# Patient Record
Sex: MALE | Race: WHITE | NOT HISPANIC OR LATINO | Employment: OTHER | URBAN - METROPOLITAN AREA
[De-identification: names, ages, dates, MRNs, and addresses within clinical notes are randomized per-mention and may not be internally consistent; named-entity substitution may affect disease eponyms.]

---

## 2017-02-15 ENCOUNTER — GENERIC CONVERSION - ENCOUNTER (OUTPATIENT)
Dept: OTHER | Facility: OTHER | Age: 72
End: 2017-02-15

## 2017-02-15 ENCOUNTER — ALLSCRIPTS OFFICE VISIT (OUTPATIENT)
Dept: OTHER | Facility: OTHER | Age: 72
End: 2017-02-15

## 2018-03-12 DIAGNOSIS — L71.9 ROSACEA: Primary | ICD-10-CM

## 2018-03-12 RX ORDER — METRONIDAZOLE 10 MG/G
GEL TOPICAL
Qty: 60 G | Refills: 0 | Status: SHIPPED | OUTPATIENT
Start: 2018-03-12 | End: 2018-07-18 | Stop reason: SDUPTHER

## 2018-07-18 ENCOUNTER — OFFICE VISIT (OUTPATIENT)
Dept: DERMATOLOGY | Facility: CLINIC | Age: 73
End: 2018-07-18
Payer: COMMERCIAL

## 2018-07-18 DIAGNOSIS — L30.8 PSORIASIFORM DERMATITIS: ICD-10-CM

## 2018-07-18 DIAGNOSIS — D22.9 MELANOCYTIC NEVUS, UNSPECIFIED LOCATION: ICD-10-CM

## 2018-07-18 DIAGNOSIS — L71.9 ROSACEA: ICD-10-CM

## 2018-07-18 DIAGNOSIS — D22.9 NEVUS: Primary | ICD-10-CM

## 2018-07-18 DIAGNOSIS — Z13.89 SCREENING FOR SKIN CONDITION: ICD-10-CM

## 2018-07-18 DIAGNOSIS — L82.1 SEBORRHEIC KERATOSIS: ICD-10-CM

## 2018-07-18 PROCEDURE — 88305 TISSUE EXAM BY PATHOLOGIST: CPT | Performed by: PATHOLOGY

## 2018-07-18 PROCEDURE — 99213 OFFICE O/P EST LOW 20 MIN: CPT | Performed by: DERMATOLOGY

## 2018-07-18 PROCEDURE — 11300 SHAVE SKIN LESION 0.5 CM/<: CPT | Performed by: DERMATOLOGY

## 2018-07-18 RX ORDER — DESOXIMETASONE 2.5 MG/G
OINTMENT TOPICAL 2 TIMES DAILY
Qty: 15 G | Refills: 3 | Status: SHIPPED | OUTPATIENT
Start: 2018-07-18 | End: 2019-09-09 | Stop reason: SDUPTHER

## 2018-07-18 RX ORDER — METRONIDAZOLE 10 MG/G
1 GEL TOPICAL DAILY
Qty: 60 G | Refills: 3 | Status: SHIPPED | OUTPATIENT
Start: 2018-07-18 | End: 2019-09-09 | Stop reason: SDUPTHER

## 2018-07-18 NOTE — PROGRESS NOTES
[address] [facility] OF [facility] DERMATOLOGY  [address] [address]     MRN: [id number] : 1945  Encounter: [id number]  Patient Information: Crow Daniel  Chief complaint:Yearly skin check for concerns regarding rosacea as well his psoriasiform dermatitis     History of present illness:  53-year-old male presents for follow-up for his history of rosacea and psoriasiform dermatitis no specific problems noted not aware the mole we noted on exam   In addition notes that he has had some irritation perianally that at this point it seems to have resolved also complaining of  A stye  No past medical history on file  No past surgical history on file  Social History   History   Alcohol use Not on file     History   Drug use: Unknown     History   Smoking Status    Never Smoker   Smokeless Tobacco    Never Used     No family history on file  Meds/Allergies   No Known Allergies    Meds:  Prior to Admission medications    Medication Sig Start Date End Date Taking?  Authorizing Provider   clonazePAM (KlonoPIN) 1 mg tablet Take by mouth   Yes Historical Provider, MD   desoximetasone (TOPICORT) 0 25 % ointment Apply topically 2 (two) times a day   Yes Historical Provider, MD   metroNIDAZOLE (METROGEL) 1 % gel APPLY SPARINGLY TO FACE AND SCALP ONCE DAILY 3/12/18  Yes David Double, MD   traMADol Everlyn Servant) 50 mg tablet Take 50 mg by mouth 4 (four) times a day as needed 17  Yes Historical Provider, MD       Subjective:     Review of Systems:    General: negative for - chills, fatigue, fever,  weight gain or weight loss  Psychological: negative for - anxiety, behavioral disorder, concentration difficulties, decreased libido, depression, irritability, memory difficulties, mood swings, sleep disturbances or suicidal ideation  ENT: negative for - hearing difficulties , nasal congestion, nasal discharge, oral lesions, sinus pain, sneezing, sore throat  Allergy and Immunology: negative for - hives, insect bite sensitivity,  Hematological and Lymphatic: negative for - bleeding problems, blood clots,bruising, swollen lymph nodes  Endocrine: negative for - hair pattern changes, hot flashes, malaise/lethargy, mood swings, palpitations, polydipsia/polyuria, skin changes, temperature intolerance or unexpected weight change  Respiratory: negative for - cough, hemoptysis, orthopnea, shortness of breath, or wheezing  Cardiovascular: negative for - chest pain, dyspnea on exertion, edema,  Gastrointestinal: negative for - abdominal pain, nausea/vomiting  Genito-Urinary: negative for - dysuria, incontinence, irregular/heavy menses or urinary frequency/urgency  Musculoskeletal: negative for - gait disturbance, joint pain, joint stiffness, joint swelling, muscle pain, muscular weakness  Dermatological:  As in HPI  Neurological: negative for confusion, dizziness, headaches, impaired coordination/balance, memory loss, numbness/tingling, seizures, speech problems, tremors or weakness       Objective: There were no vitals taken for this visit  Physical Exam:    General Appearance:    Alert, cooperative, no distress   Head:    Normocephalic, without obvious abnormality, atraumatic           Skin:   A full skin exam was performed including scalp, head scalp, eyes, ears, nose, lips, neck, chest, axilla, abdomen, back, buttocks, bilateral upper extremities, bilateral lower extremities, hands, feet, fingers, toes, fingernails, and toenails  5 mm pigmented macule noted on the back with some slight asymmetric pigmentation nothing else atypical noted normal keratotic papules greasy stuck on appearance no active psoriasiform rash facial erythema has diminished without papules or pustules           Shave excision Procedure Note    Pre-operative Diagnosis:  Irritated nevus    Plan:  1  Instructed to keep the wound dry and covered for 24 and clean thereafter    2  Warning signs of infection were reviewed  3  Recommended that the patient use OTC acetaminophen as needed for pain  Locations:   Lower back  Indications:   Irregular appearance    Anesthesia: Lidocaine 1% without epinephrine without added sodium bicarbonate    Procedure Details     Patient informed of the risks (including bleeding and infection) and benefits of the   procedure and Verbal informed consent obtained  The lesion and surrounding area were prepped using alcohol  A Blue blade razor was used to remove the lesion  Hemostasis achieved with aluminum chloride  Petrolatum and a sterile dressing applied  The specimen was sent for pathologic examination  The patient tolerated the procedure(s) well  Complications:  none  Assessment:     1  Nevus     2  Psoriasiform dermatitis  desoximetasone (TOPICORT) 0 25 % ointment   3  Screening for skin condition     4  Melanocytic nevus, unspecified location     5  Rosacea  metroNIDAZOLE (METROGEL) 1 % gel   6  Seborrheic keratosis           Plan:   Nevi reviewed the concept of ABCDE and ugly duckling nothing markedly atypical patient reassured lesion on back was removed because of slight atypical appearance no previous history  rosacea under good control continue same therapy   psoriasiform dermatitis not active refilled his prescription  awseb  Screening for Dermatologic Disorders: Nothing else of concern noted on complete exam follow up in 1 year  Patient with a stye advised to see an ophthalmologist if this is continues to be an issue        Johan Hua MD  9/50/4794,9:90 PM    Portions of the record may have been created with voice recognition software   Occasional wrong word or "sound a like" substitutions may have occurred due to the inherent limitations of voice recognition software   Read the chart carefully and recognize, using context, where substitutions have occurred

## 2018-07-18 NOTE — PATIENT INSTRUCTIONS
Nevi reviewed the concept of ABCDE and ugly duckling nothing markedly atypical patient reassured lesion on back was removed because of slight atypical appearance no previous history  rosacea under good control continue same therapy   psoriasiform dermatitis not active refilled his prescription  awseb  Screening for Dermatologic Disorders: Nothing else of concern noted on complete exam follow up in 1 year  Patient with a stye advised to see an ophthalmologist if this is continues to be an issue  Wound care instructions given to patient

## 2018-07-24 ENCOUNTER — TELEPHONE (OUTPATIENT)
Dept: DERMATOLOGY | Facility: CLINIC | Age: 73
End: 2018-07-24

## 2018-07-24 NOTE — TELEPHONE ENCOUNTER
----- Message from Ambrose Graff MD sent at 7/23/2018  1:48 PM EDT -----  Please call him of normal pathology

## 2018-07-27 ENCOUNTER — OFFICE VISIT (OUTPATIENT)
Dept: GASTROENTEROLOGY | Facility: CLINIC | Age: 73
End: 2018-07-27
Payer: COMMERCIAL

## 2018-07-27 VITALS
BODY MASS INDEX: 28.49 KG/M2 | SYSTOLIC BLOOD PRESSURE: 124 MMHG | DIASTOLIC BLOOD PRESSURE: 70 MMHG | WEIGHT: 215 LBS | HEIGHT: 73 IN | HEART RATE: 78 BPM

## 2018-07-27 DIAGNOSIS — R19.4 CHANGE IN BOWEL HABITS: Primary | ICD-10-CM

## 2018-07-27 DIAGNOSIS — R19.7 DIARRHEA, UNSPECIFIED TYPE: ICD-10-CM

## 2018-07-27 DIAGNOSIS — R15.2 FECAL URGENCY: ICD-10-CM

## 2018-07-27 PROBLEM — R10.32 LLQ PAIN: Status: ACTIVE | Noted: 2018-07-27

## 2018-07-27 PROCEDURE — 99204 OFFICE O/P NEW MOD 45 MIN: CPT | Performed by: INTERNAL MEDICINE

## 2018-07-27 NOTE — PROGRESS NOTES
[facility]      Chief Complaint:  Change in bowel habits    HPI:  Gracie Gutierrez is a self-referred 81 y o   male who presents with change in bowel habits  According to the patient he has a lifelong history of what he calls colitis  He was never diagnosed with ulcerative colitis that he knows of  In all likelihood this is irritable bowel syndrome  His last colonoscopy was many years ago  According to the patient for sometime now he has been having postprandial urgency and cramping  Having a bowel movement relieves this  He is having some loose stools  There is no bleeding  No weight loss  He denies any melena or hematochezia  He has occasional nocturnal symptomatology  Patient notes that he eats ice cream very frequently  No nausea or vomiting  No dysphagia  No fever or chills  No other definitive exacerbating or remitting factors  No exertional symptomatology  He notes that his sister and her son both have probable lactose intolerance  No other family history of any GI disease  Review of Systems:   Constitutional: No fever or chills, feels well, no tiredness, no recent weight gain or weight loss  HENT: No complaints of earache, no hearing loss, no nosebleeds, no nasal discharge, no sore throat, no hoarseness  Eyes: No complaints of eye pain, no red eyes, no discharge from eyes, no itchy eyes  Cardiovascular: No complaints of slow heart rate, no fast heart rate, no chest pain, no palpitations, no leg claudication, no lower extremity edema  Respiratory: No complaints of shortness of breath, no wheezing, no cough, no SOB on exertion, no orthopnea  Gastrointestinal: As noted in HPI  Genitourinary: No complaints of dysuria, no incontinence, no hesitancy, no nocturia  Musculoskeletal: No complaints of arthralgia, no myalgias, no joint swelling or stiffness, no limb pain or swelling     Neurological:  Peripheral neuropathy, dizziness  Skin: No complaints of skin rash or skin lesions, no itching, no skin wound, no dry skin  Hematological/Lymphatic: No complaints of swollen glands, does not bleed easy  Allergic/Immunologic: No immunocompromised state  Endocrine:  No complaints of polyuria, no polydipsia  Psychiatric/Behavioral: is not suicidal, no sleep disturbances, no anxiety or depression, no change in personality, no emotional problems  Historical Information   Past Medical History:   Diagnosis Date    Insomnia     Neuropathy      Past Surgical History:   Procedure Laterality Date    TONSILLECTOMY       Social History   History   Alcohol Use    Yes     Comment: Social     History   Drug Use No     History   Smoking Status    Never Smoker   Smokeless Tobacco    Never Used     Family History   Problem Relation Age of Onset    Stroke Mother     Lactose intolerance Sister          Current Medications: has a current medication list which includes the following prescription(s): clonazepam, desoximetasone, metronidazole, and tramadol  Vital Signs: /70   Pulse 78   Ht 6' 1" (1 854 m)   Wt 97 5 kg (215 lb)   BMI 28 37 kg/m²       Physical Exam:   Constitutional  General Appearance: No acute distress, well appearing and well nourished  Head  Normocephalic  Eyes  Conjunctivae and lids: No swelling, erythema, or discharge  Pupils and irises: Equal, round and reactive to light  Ears, Nose, Mouth, and Throat  External inspection of ears and nose: Normal  Nasal mucosa, septum and turbinates: Normal without edema or erythema/   Oropharynx: Normal with no erythema, edema, exudate or lesions  Neck  Normal range of motion  Neck supple  Cardiovascular  Auscultation of the heart: Normal rate and rhythm, normal S1 and S2 without murmurs  Examination of the extremities for edema and/or varicosities: Normal  Pulmonary/Chest  Respiratory effort: No increased work of breathing or signs of respiratory distress     Auscultation of lungs: Clear to auscultation, equal breath sounds bilaterally, no wheezes, rales, no rhonchi  Abdomen  Abdomen: Non-tender, no masses  Liver and spleen: No hepatomegaly or splenomegaly  Musculoskeletal  Gait and station: normal   Digits and Nails: normal without clubbing or cyanosis  Inspection/palpation of joints, bones, and muscles: Normal  Neurological  No nystagmus or asterixis  Skin  Skin and subcutaneous tissue: Normal without rashes or lesions  Lymphatic  Palpation of the lymph nodes in neck: No lymphadenopathy  Psychiatric  Orientation to person, place and time: Normal   Mood and affect: Normal          Labs:  No results found for: ALBUMIN, ALT, AST, BUN, CALCIUM, CL, CHOL, CO2, CREATININE, GFRAA, GFRNONAA, GLU, HDL, HCT, HGB, HGBA1C, LDL, MG, PHOS, PLT, K, PSA, NA, TRIG, WBC      X-Rays & Procedures:   No orders to display           ______________________________________________________________________      Assessment & Plan:     Diagnoses and all orders for this visit:    Change in bowel habits    Fecal urgency    Diarrhea, unspecified type    Other orders  -     Diet NPO; Sips with meds; Standing  -     Void on call to OR; Standing  -     Insert peripheral IV; Standing      Patient will be scheduled for colonoscopy  He is given a copy of a lactose-free diet  Further recommendations will depend on the study results

## 2018-08-09 ENCOUNTER — ANESTHESIA (OUTPATIENT)
Dept: PERIOP | Facility: HOSPITAL | Age: 73
End: 2018-08-09
Payer: COMMERCIAL

## 2018-08-09 ENCOUNTER — HOSPITAL ENCOUNTER (OUTPATIENT)
Facility: HOSPITAL | Age: 73
Setting detail: OUTPATIENT SURGERY
Discharge: HOME | End: 2018-08-09
Attending: INTERNAL MEDICINE | Admitting: INTERNAL MEDICINE
Payer: COMMERCIAL

## 2018-08-09 ENCOUNTER — ANESTHESIA EVENT (OUTPATIENT)
Dept: PERIOP | Facility: HOSPITAL | Age: 73
End: 2018-08-09
Payer: COMMERCIAL

## 2018-08-09 VITALS
WEIGHT: 206.35 LBS | DIASTOLIC BLOOD PRESSURE: 64 MMHG | RESPIRATION RATE: 16 BRPM | SYSTOLIC BLOOD PRESSURE: 127 MMHG | HEART RATE: 51 BPM | OXYGEN SATURATION: 99 % | BODY MASS INDEX: 27.35 KG/M2 | HEIGHT: 73 IN | TEMPERATURE: 97.1 F

## 2018-08-09 DIAGNOSIS — R10.32 LLQ PAIN: ICD-10-CM

## 2018-08-09 DIAGNOSIS — R19.7 DIARRHEA, UNSPECIFIED TYPE: ICD-10-CM

## 2018-08-09 PROCEDURE — 45384 COLONOSCOPY W/LESION REMOVAL: CPT | Performed by: INTERNAL MEDICINE

## 2018-08-09 PROCEDURE — 45380 COLONOSCOPY AND BIOPSY: CPT | Performed by: INTERNAL MEDICINE

## 2018-08-09 PROCEDURE — 88305 TISSUE EXAM BY PATHOLOGIST: CPT | Performed by: PATHOLOGY

## 2018-08-09 RX ORDER — ONDANSETRON 2 MG/ML
4 INJECTION INTRAMUSCULAR; INTRAVENOUS ONCE AS NEEDED
Status: CANCELLED | OUTPATIENT
Start: 2018-08-09

## 2018-08-09 RX ORDER — SODIUM CHLORIDE, SODIUM LACTATE, POTASSIUM CHLORIDE, CALCIUM CHLORIDE 600; 310; 30; 20 MG/100ML; MG/100ML; MG/100ML; MG/100ML
75 INJECTION, SOLUTION INTRAVENOUS CONTINUOUS
Status: DISCONTINUED | OUTPATIENT
Start: 2018-08-09 | End: 2018-08-09 | Stop reason: HOSPADM

## 2018-08-09 RX ORDER — SODIUM CHLORIDE, SODIUM LACTATE, POTASSIUM CHLORIDE, CALCIUM CHLORIDE 600; 310; 30; 20 MG/100ML; MG/100ML; MG/100ML; MG/100ML
125 INJECTION, SOLUTION INTRAVENOUS CONTINUOUS
Status: DISCONTINUED | OUTPATIENT
Start: 2018-08-09 | End: 2018-08-09 | Stop reason: HOSPADM

## 2018-08-09 RX ORDER — PROPOFOL 10 MG/ML
INJECTION, EMULSION INTRAVENOUS AS NEEDED
Status: DISCONTINUED | OUTPATIENT
Start: 2018-08-09 | End: 2018-08-09 | Stop reason: SURG

## 2018-08-09 RX ORDER — MEPERIDINE HYDROCHLORIDE 50 MG/ML
12.5 INJECTION INTRAMUSCULAR; INTRAVENOUS; SUBCUTANEOUS AS NEEDED
Status: CANCELLED | OUTPATIENT
Start: 2018-08-09

## 2018-08-09 RX ORDER — ALBUTEROL SULFATE 2.5 MG/3ML
2.5 SOLUTION RESPIRATORY (INHALATION) ONCE AS NEEDED
Status: CANCELLED | OUTPATIENT
Start: 2018-08-09

## 2018-08-09 RX ADMIN — PROPOFOL 10 MG: 10 INJECTION, EMULSION INTRAVENOUS at 09:07

## 2018-08-09 RX ADMIN — PROPOFOL 100 MG: 10 INJECTION, EMULSION INTRAVENOUS at 08:56

## 2018-08-09 RX ADMIN — PROPOFOL 30 MG: 10 INJECTION, EMULSION INTRAVENOUS at 09:00

## 2018-08-09 RX ADMIN — PROPOFOL 30 MG: 10 INJECTION, EMULSION INTRAVENOUS at 09:04

## 2018-08-09 RX ADMIN — SODIUM CHLORIDE, SODIUM LACTATE, POTASSIUM CHLORIDE, AND CALCIUM CHLORIDE: .6; .31; .03; .02 INJECTION, SOLUTION INTRAVENOUS at 08:00

## 2018-08-09 NOTE — ANESTHESIA POSTPROCEDURE EVALUATION
Post-Op Assessment Note      CV Status:  Stable    Mental Status:  Alert and awake    Hydration Status:  Stable    PONV Controlled:  None    Airway Patency:  Patent and adequate    Post Op Vitals Reviewed: Yes          Staff: Anesthesiologist, CRNA           BP   112/62   Temp     Pulse  66   Resp   18   SpO2   98%

## 2018-08-09 NOTE — DISCHARGE INSTRUCTIONS
Impression:  1  Ascending colon polyp status post hot biopsy polypectomy     2  Pancolonic diverticulosis, moderate     3  Status post ascending and descending biopsies for microscopic colitis        RECOMMENDATIONS:  Call in 1 week for biopsy results  Follow-up colonoscopy in 5 years       Colonoscopy   WHAT YOU NEED TO KNOW:   A colonoscopy is a procedure to examine the inside of your colon (intestine) with a scope  Polyps or tissue growths may have been removed during your colonoscopy  It is normal to feel bloated and to have some abdominal discomfort  You should be passing gas  If you have hemorrhoids or you had polyps removed, you may have a small amount of bleeding  DISCHARGE INSTRUCTIONS:   Seek care immediately if:   · You have a large amount of bright red blood in your bowel movements  · Your abdomen is hard and firm and you have severe pain  · You have sudden trouble breathing  Contact your healthcare provider if:   · You develop a rash or hives  · You have a fever within 24 hours of your procedure  · You have not had a bowel movement for 3 days after your procedure  · You have questions or concerns about your condition or care  Activity:   · Do not lift, strain, or run  for 3 days after your procedure  · Rest after your procedure  You have been given medicine to relax you  Do not  drive or make important decisions until the day after your procedure  Return to your normal activity as directed  · Relieve gas and discomfort from bloating  by lying on your right side with a heating pad on your abdomen  You may need to take short walks to help the gas move out  Eat small meals until bloating is relieved  If you had polyps removed: For 7 days after your procedure:  · Do not  take aspirin  · Do not  go on long car rides  Help prevent constipation:   · Eat a variety of healthy foods    Healthy foods include fruit, vegetables, whole-grain breads, low-fat dairy products, beans, lean meat, and fish  Ask if you need to be on a special diet  Your healthcare provider may recommend that you eat high-fiber foods such as cooked beans  Fiber helps you have regular bowel movements  · Drink liquids as directed  Adults should drink between 9 and 13 eight-ounce cups of liquid every day  Ask what amount is best for you  For most people, good liquids to drink are water, juice, and milk  · Exercise as directed  Talk to your healthcare provider about the best exercise plan for you  Exercise can help prevent constipation, decrease your blood pressure and improve your health  Follow up with your healthcare provider as directed:  Write down your questions so you remember to ask them during your visits  © 2017 [address] Information is for End User's use only and may not be sold, redistributed or otherwise used for commercial purposes  All illustrations and images included in CareNotes® are the copyrighted property of [employer] [address] [employer]  or Yonny Boyd  The above information is an  only  It is not intended as medical advice for individual conditions or treatments  Talk to your doctor, nurse or pharmacist before following any medical regimen to see if it is safe and effective for you

## 2018-08-09 NOTE — OP NOTE
Postoperative Note  PATIENT NAME: Srinivas Anderson  : 1945  MRN: [id number]  [facility] ROOM 01    Surgery Date: 2018    POST-OP DIAGNOSIS: See the impression below    SEDATION: Monitored anesthesia care, check anesthesia records    PHYSICAL EXAM:  Vitals:    18 0804   BP: 130/74   Pulse: 70   Resp: 18   Temp: (!) 97 1 °F (36 2 °C)   SpO2: 98%     Body mass index is 27 22 kg/m²  General: NAD  Heart: S1 & S2 normal, RRR  Lungs: CTA, No rales or rhonchi  Abdomen: Soft, nontender, nondistended, good bowel sounds    CONSENT:  Informed consent was obtained for the procedure, including sedation after explaining the risks and benefits of the procedure  Risks including but not limited to bleeding, perforation, infection, aspiration were discussed in detail  Also explained about less than 100%$ sensitivity with the exam and other alternatives  DESCRIPTION:   Procedure:  Fiberoptic colonoscopy with hot biopsy polypectomy and excisional biopsy    Indications:  24-year-old male with a change in bowel habits and diarrhea of undetermined etiology    ASA 2    Premedicated with mac anesthesia    Patient is identified by me  He is examined prior to the procedure and found to be in stable condition  He is attached a cardiac monitor and pulse oximeter and placed in the left lateral position  After adequate intravenous sedation the Olympus video colonoscope was inserted and passed easily to the cecum  The ileocecal valve and the appendiceal orifice are identified and the scope was slowly withdrawn with excellent circumferential visualization of the mucosa  The preparation is excellent  A single diminutive sessile polyp was found in the ascending colon  This was removed completely with hot biopsy technique with excellent residual chart  Polyp was retrieved and sent to pathology  Note is made of diverticuli throughout the colon  These are small and medium in size    None have impacted fecaliths a none her inflamed or bleeding  Biopsies taken of the ascending and descending colon rule out microscopic colitis  There is excellent hemostasis  Retroversion is normal  No other inflammatory neoplastic or vascular lesions are noted  The patient tolerated the procedure well and was stable throughout  Impression:  1  Ascending colon polyp status post hot biopsy polypectomy    2  Pancolonic diverticulosis, moderate    3  Status post ascending and descending biopsies for microscopic colitis      RECOMMENDATIONS:  Call in 1 week for biopsy results  Follow-up colonoscopy in 5 years    COMPLICATIONS:  None; patient tolerated the procedure well  DISPOSITION: PACU  CONDITION: Stable    Austin Alan MD  8/9/2018,9:10 AM        Portions of the record may have been created with voice recognition software   Occasional wrong word or "sound a like" substitutions may have occurred due to the inherent limitations of voice recognition software   Read the chart carefully and recognize, using context, where substitutions have occurred

## 2018-08-09 NOTE — ANESTHESIA PREPROCEDURE EVALUATION
Review of Systems/Medical History  Patient summary reviewed        Cardiovascular  Negative cardio ROS    Pulmonary  Negative pulmonary ROS        GI/Hepatic  Negative GI/hepatic ROS          Negative  ROS        Endo/Other  Negative endo/other ROS      GYN  Negative gynecology ROS          Hematology  Negative hematology ROS      Musculoskeletal  Negative musculoskeletal ROS        Neurology  Negative neurology ROS      Psychology   Negative psychology ROS              Physical Exam    Airway    Mallampati score: II  TM Distance: >3 FB  Neck ROM: full     Dental   No notable dental hx     Cardiovascular  Comment: Negative ROS,     Pulmonary      Other Findings        Anesthesia Plan  ASA Score- 1     Anesthesia Type- IV sedation with anesthesia with ASA Monitors  Additional Monitors:   Airway Plan:     Comment: I have seen the patient and reviewed the history  Patient to receive IV sedation with full ASA monitors  Risks discussed with the patient, consent signed        Plan Factors-    Induction- intravenous  Postoperative Plan-     Informed Consent- Anesthetic plan and risks discussed with patient  I personally reviewed this patient with the CRNA  Discussed and agreed on the Anesthesia Plan with the CRNA  Bree Hayes

## 2018-08-16 ENCOUNTER — TELEPHONE (OUTPATIENT)
Dept: GASTROENTEROLOGY | Facility: CLINIC | Age: 73
End: 2018-08-16

## 2019-09-09 ENCOUNTER — OFFICE VISIT (OUTPATIENT)
Dept: DERMATOLOGY | Facility: CLINIC | Age: 74
End: 2019-09-09
Payer: COMMERCIAL

## 2019-09-09 DIAGNOSIS — Z13.89 SCREENING FOR SKIN CONDITION: ICD-10-CM

## 2019-09-09 DIAGNOSIS — L30.8 PSORIASIFORM DERMATITIS: ICD-10-CM

## 2019-09-09 DIAGNOSIS — L82.1 SEBORRHEIC KERATOSIS: Primary | ICD-10-CM

## 2019-09-09 DIAGNOSIS — L71.9 ROSACEA: ICD-10-CM

## 2019-09-09 PROCEDURE — 99213 OFFICE O/P EST LOW 20 MIN: CPT | Performed by: DERMATOLOGY

## 2019-09-09 RX ORDER — DESOXIMETASONE 2.5 MG/G
OINTMENT TOPICAL 2 TIMES DAILY
Qty: 15 G | Refills: 3 | Status: SHIPPED | OUTPATIENT
Start: 2019-09-09

## 2019-09-09 RX ORDER — METRONIDAZOLE 10 MG/G
1 GEL TOPICAL DAILY
Qty: 60 G | Refills: 3 | Status: SHIPPED | OUTPATIENT
Start: 2019-09-09 | End: 2021-04-01 | Stop reason: SDUPTHER

## 2019-09-09 NOTE — PROGRESS NOTES
[facility] DERMATOLOGY  [address] [place] [address]  Wendi Goldstein [place] [address]  [phone]  [phone]     MRN: [id number] : 1945  Encounter: [id number]  Patient Information: Douglas Braden  Chief complaint:  Yearly checkup    History of present illness:  72-year-old male with history of psoriasiform dermatitis and rosacea presents for overall checkup overall doing well both conditions appear to  be under control no other concerns noted  Past Medical History:   Diagnosis Date    Anxiety     Insomnia     Neuropathy      Past Surgical History:   Procedure Laterality Date    APPENDECTOMY      COLONOSCOPY      KNEE ARTHROSCOPY      Right knee    [place] COLONOSCOPY FLX DX W/COLLJ SPEC WHEN PFRMD N/A 2018    Procedure: COLONOSCOPY;  Surgeon: Jamil Alex MD;  Location: [facility]; Service: Gastroenterology     Social History   Social History     Substance and Sexual Activity   Alcohol Use Yes    Comment: Social     Social History     Substance and Sexual Activity   Drug Use No     Social History     Tobacco Use   Smoking Status Never Smoker   Smokeless Tobacco Never Used     Family History   Problem Relation Age of Onset    Stroke Mother     Lactose intolerance Sister      Meds/Allergies   No Known Allergies    Meds:  Prior to Admission medications    Medication Sig Start Date End Date Taking?  Authorizing Provider   clonazePAM (KlonoPIN) 1 mg tablet Take by mouth   Yes Historical Provider, MD   desoximetasone (TOPICORT) 0 25 % ointment Apply topically 2 (two) times a day To rash ionbody as needed 18  Yes Jessica Reyna MD   metroNIDAZOLE (METROGEL) 1 % gel Apply 1 application topically daily Sparingly to face and scalp 18  Yes Jessica Reyna MD   traMADol Shirlie Kaur) 50 mg tablet Take 50 mg by mouth 4 (four) times a day as needed 17   Historical Provider, MD       Subjective:     Review of Systems:    General: negative for - chills, fatigue, fever, weight gain or weight loss  Psychological: negative for - anxiety, behavioral disorder, concentration difficulties, decreased libido, depression, irritability, memory difficulties, mood swings, sleep disturbances or suicidal ideation  ENT: negative for - hearing difficulties , nasal congestion, nasal discharge, oral lesions, sinus pain, sneezing, sore throat  Allergy and Immunology: negative for - hives, insect bite sensitivity,  Hematological and Lymphatic: negative for - bleeding problems, blood clots,bruising, swollen lymph nodes  Endocrine: negative for - hair pattern changes, hot flashes, malaise/lethargy, mood swings, palpitations, polydipsia/polyuria, skin changes, temperature intolerance or unexpected weight change  Respiratory: negative for - cough, hemoptysis, orthopnea, shortness of breath, or wheezing  Cardiovascular: negative for - chest pain, dyspnea on exertion, edema,  Gastrointestinal: negative for - abdominal pain, nausea/vomiting  Genito-Urinary: negative for - dysuria, incontinence, irregular/heavy menses or urinary frequency/urgency  Musculoskeletal: negative for - gait disturbance, joint pain, joint stiffness, joint swelling, muscle pain, muscular weakness  Dermatological:  As in HPI  Neurological: negative for confusion, dizziness, headaches, impaired coordination/balance, memory loss, numbness/tingling, seizures, speech problems, tremors or weakness       Objective: There were no vitals taken for this visit      Physical Exam:    General Appearance:    Alert, cooperative, no distress   Head:    Normocephalic, without obvious abnormality, atraumatic           Skin:   A full skin exam was performed including scalp, head scalp, eyes, ears, nose, lips, neck, chest, axilla, abdomen, back, buttocks, bilateral upper extremities, bilateral lower extremities, hands, feet, fingers, toes, fingernails, and toenails facial erythema without papules pustules marked improvement no active rash noted at this time     Assessment:     1  Seborrheic keratosis     2  Psoriasiform dermatitis  desoximetasone (TOPICORT) 0 25 % ointment   3  Rosacea  metroNIDAZOLE (METROGEL) 1 % gel   4  Screening for skin condition           Plan:   Seborrheic Keratosis  Patient reasurred these are normal growths we acquire with age no treatment needed  Psoriasiform dermatitis rosacea under good control continue same therapy  Screening for Dermatologic Disorders: Nothing else of concern noted on complete exam follow up in 1 year       Liana Gitelman, MD  9/9/2019,3:19 PM    Portions of the record may have been created with voice recognition software   Occasional wrong word or "sound a like" substitutions may have occurred due to the inherent limitations of voice recognition software   Read the chart carefully and recognize, using context, where substitutions have occurred

## 2019-09-09 NOTE — PATIENT INSTRUCTIONS
Seborrheic Keratosis  Patient reasurred these are normal growths we acquire with age no treatment needed    Psoriasiform dermatitis rosacea under good control continue same therapy  Screening for Dermatologic Disorders: Nothing else of concern noted on complete exam follow up in 1 year

## 2021-03-05 DIAGNOSIS — Z23 ENCOUNTER FOR IMMUNIZATION: ICD-10-CM

## 2021-04-01 ENCOUNTER — TELEMEDICINE (OUTPATIENT)
Dept: DERMATOLOGY | Facility: CLINIC | Age: 76
End: 2021-04-01
Payer: COMMERCIAL

## 2021-04-01 DIAGNOSIS — L71.9 ROSACEA: ICD-10-CM

## 2021-04-01 DIAGNOSIS — R21 RASH: ICD-10-CM

## 2021-04-01 DIAGNOSIS — Z13.89 SCREENING FOR SKIN CONDITION: ICD-10-CM

## 2021-04-01 DIAGNOSIS — L30.8 PSORIASIFORM DERMATITIS: Primary | ICD-10-CM

## 2021-04-01 PROCEDURE — 99213 OFFICE O/P EST LOW 20 MIN: CPT | Performed by: DERMATOLOGY

## 2021-04-01 RX ORDER — METRONIDAZOLE 10 MG/G
1 GEL TOPICAL DAILY
Qty: 60 G | Refills: 3 | Status: SHIPPED | OUTPATIENT
Start: 2021-04-01

## 2021-04-01 NOTE — PROGRESS NOTES
Virtual Regular Visit      Assessment/Plan:  1  Psoriasiform dermatitis     2  Rash     3  Rosacea     4  Screening for skin condition      psoriasiform dermatitis/rash this is been a process that has been going on for greater than 6 years with pruritus difficult for me to assess on virtual exam   Patient has intermittently used desoximetasone for greater than 5 years and we never really have defined this process and appears to be getting worse at this time and even looks urticarial to me advised patient to try a nonsedating antihistamine such as Claritin to see if this will help also want him to come back in the office and if the rash is still present would want to go ahead and biopsy this to better define this process   rosacea under good control continue with Metrogel   nothing else of concern noted but difficult to assess on video exam      Problem List Items Addressed This Visit        Musculoskeletal and Integument    Rosacea      Other Visit Diagnoses     Psoriasiform dermatitis    -  Primary    Rash        Screening for skin condition                   Reason for visit is  Refill medications for his continues itching rash  Chief Complaint   Patient presents with    Medication Refill    Virtual Regular Visit        Encounter provider Abdi Mendez MD    Provider located at [address] [place] [address]      Recent Visits  No visits were found meeting these conditions  Showing recent visits within past 7 days and meeting all other requirements     Today's Visits  Date Type Provider Dept   04/01/21 Telemedicine Abdi Mendez MD [address] [place] [address] Dermatology   Showing today's visits and meeting all other requirements     Future Appointments  No visits were found meeting these conditions     Showing future appointments within next 150 days and meeting all other requirements        The patient was identified by name and date of birth  Italo Watson was informed that this is a telemedicine visit and that the visit is being conducted [place] [facility] and patient was informed that this is a secure, HIPAA-compliant platform  He agrees to proceed     My office door was closed  No one else was in the room  He acknowledged consent and understanding of privacy and security of the video platform  The patient has agreed to participate and understands they can discontinue the visit at any time  Patient is aware this is a billable service  Subjective  Italo Watson is a 70 y o  male  Who  I have not seen for year and a half   With previously noted history of rosacea and also a intermittent rash that seems to be quite itchy patient has been complaining of this rash for over 5 years and he has been using desoximetasone as a treatment for the symptomatic rash to any feels the gets immediate improvement notes that the rash appears to be getting worse recently no other concerns noted except for his rosacea on the scalp which has been under control with Metrogel      HPI     Past Medical History:   Diagnosis Date    Anxiety     Insomnia     Neuropathy        Past Surgical History:   Procedure Laterality Date    APPENDECTOMY      COLONOSCOPY      KNEE ARTHROSCOPY      Right knee    [place] COLONOSCOPY FLX DX W/COLLJ SPEC WHEN PFRMD N/A 8/9/2018    Procedure: COLONOSCOPY;  Surgeon: Vikram Negrete MD;  Location: [facility];   Service: Gastroenterology       Current Outpatient Medications   Medication Sig Dispense Refill    clonazePAM (KlonoPIN) 1 mg tablet Take by mouth      desoximetasone (TOPICORT) 0 25 % ointment Apply topically 2 (two) times a day To rash ionbody as needed 15 g 3    metroNIDAZOLE (METROGEL) 1 % gel Apply 1 application topically daily Sparingly to face and scalp 60 g 3    traMADol (ULTRAM) 50 mg tablet Take 50 mg by mouth 4 (four) times a day as needed  0     No current facility-administered medications for this visit  No Known Allergies    Review of Systems    Video Exam erythematous papules scattered on the skin some appears somewhat urticarial difficult to defined if there is any scale on video camera nothing else of concern noted    There were no vitals filed for this visit  Physical Exam     I spent 15 minutes directly with the patient during this visit      VIRTUAL VISIT DISCLAIMER    Raiza Gentile acknowledges that he has consented to an online visit or consultation  He understands that the online visit is based solely on information provided by him, and that, in the absence of a face-to-face physical evaluation by the physician, the diagnosis he receives is both limited and provisional in terms of accuracy and completeness  This is not intended to replace a full medical face-to-face evaluation by the physician  Raiza Gentile understands and accepts these terms

## 2021-04-29 ENCOUNTER — OFFICE VISIT (OUTPATIENT)
Dept: DERMATOLOGY | Facility: CLINIC | Age: 76
End: 2021-04-29
Payer: COMMERCIAL

## 2021-04-29 VITALS — TEMPERATURE: 96.1 F

## 2021-04-29 DIAGNOSIS — L29.9 PRURITUS: Primary | ICD-10-CM

## 2021-04-29 PROCEDURE — 99213 OFFICE O/P EST LOW 20 MIN: CPT | Performed by: DERMATOLOGY

## 2021-04-29 NOTE — PROGRESS NOTES
[address]  [address] [facility] [address]  [phone]  [phone]     MRN: [id number] : 1945  Encounter: [id number]  Patient Information: Paula Spears  Chief complaint: itching    History of present illness:  70-year-old male presents concerned regarding continued problems with itching patient was seen on virtual exam recently and unable to really discern anything and was advised to come in  Patient concerned regarding itching that is been a problem for quite a while and has used desoximetasone to control the itch is present continues to have issues with this process  Patient has noted that the itching has been going on for several years he had worsening rash before but that rash has resolved nothing else of concern noted at this time  Past Medical History:   Diagnosis Date    Anxiety     Insomnia     Neuropathy      Past Surgical History:   Procedure Laterality Date    APPENDECTOMY      COLONOSCOPY      KNEE ARTHROSCOPY      Right knee    [place] COLONOSCOPY FLX DX W/COLLJ SPEC WHEN PFRMD N/A 2018    Procedure: COLONOSCOPY;  Surgeon: Amber Moffett MD;  Location: [facility]; Service: Gastroenterology     Social History   Social History     Substance and Sexual Activity   Alcohol Use Yes    Comment: Social     Social History     Substance and Sexual Activity   Drug Use No     Social History     Tobacco Use   Smoking Status Never Smoker   Smokeless Tobacco Never Used     Family History   Problem Relation Age of Onset    Stroke Mother     Lactose intolerance Sister      Meds/Allergies   No Known Allergies    Meds:  Prior to Admission medications    Medication Sig Start Date End Date Taking?  Authorizing Provider   clonazePAM (KlonoPIN) 1 mg tablet Take by mouth    Historical Provider, MD   desoximetasone (TOPICORT) 0 25 % ointment Apply topically 2 (two) times a day To rash ionbody as needed 19   Salas Osborne MD metroNIDAZOLE (METROGEL) 1 % gel Apply 1 application topically daily Sparingly to face and scalp 4/1/21   Stevie Tierney MD   traMADol Maylon Sic[place]) 50 mg tablet Take 50 mg by mouth 4 (four) times a day as needed 11/17/17   Historical Provider, MD       Subjective:     Review of Systems:    General: negative for - chills, fatigue, fever,  weight gain or weight loss  Psychological: negative for - anxiety, behavioral disorder, concentration difficulties, decreased libido, depression, irritability, memory difficulties, mood swings, sleep disturbances or suicidal ideation  ENT: negative for - hearing difficulties , nasal congestion, nasal discharge, oral lesions, sinus pain, sneezing, sore throat  Allergy and Immunology: negative for - hives, insect bite sensitivity,  Hematological and Lymphatic: negative for - bleeding problems, blood clots,bruising, swollen lymph nodes  Endocrine: negative for - hair pattern changes, hot flashes, malaise/lethargy, mood swings, palpitations, polydipsia/polyuria, skin changes, temperature intolerance or unexpected weight change  Respiratory: negative for - cough, hemoptysis, orthopnea, shortness of breath, or wheezing  Cardiovascular: negative for - chest pain, dyspnea on exertion, edema,  Gastrointestinal: negative for - abdominal pain, nausea/vomiting  Genito-Urinary: negative for - dysuria, incontinence, irregular/heavy menses or urinary frequency/urgency  Musculoskeletal: negative for - gait disturbance, joint pain, joint stiffness, joint swelling, muscle pain, muscular weakness  Dermatological:  As in HPI  Neurological: negative for confusion, dizziness, headaches, impaired coordination/balance, memory loss, numbness/tingling, seizures, speech problems, tremors or weakness       Objective:   Temp (!) 96 1 °F (35 6 °C) (Temporal)     Physical Exam:    General Appearance:    Alert, cooperative, no distress   Head:    Normocephalic, without obvious abnormality, atraumatic           Skin: A full skin exam was performed including scalp, head scalp, eyes, ears, nose, lips, neck, chest, axilla, abdomen, back, buttocks, bilateral upper extremities, bilateral lower extremities, hands, feet, fingers, toes, fingernails, and toenails no specific rash noted at this time  Assessment:     1  Pruritus           Plan:   Pruritus does not seem to be any underlying process that is triggering this he has had blood work in the past that did not show any evidence of any other concerns it may be also stress related eyes suggested again use of moisturizing cream such as CeraVe or Cetaphil also suggest mild soap like Dove for sensitive skin warm water for bathing not real hot and also to use a nonsedating antihistamine such as Zyrtec or Claritin and follow-up if no improvement noted    Uzma Burleson MD  1/64/5739,4:29 PM    Portions of the record may have been created with voice recognition software   Occasional wrong word or "sound a like" substitutions may have occurred due to the inherent limitations of voice recognition software   Read the chart carefully and recognize, using context, where substitutions have occurred

## 2021-04-29 NOTE — PATIENT INSTRUCTIONS
Pruritus does not seem to be any underlying process that is triggering this he has had blood work in the past that did not show any evidence of any other concerns it may be also stress related eyes suggested again use of moisturizing cream such as CeraVe or Cetaphil also suggest mild soap like Dove for sensitive skin warm water for bathing not real hot and also to use a nonsedating antihistamine such as Zyrtec or Claritin and follow-up if no improvement noted

## 2022-06-13 ENCOUNTER — OFFICE VISIT (OUTPATIENT)
Dept: DERMATOLOGY | Facility: CLINIC | Age: 77
End: 2022-06-13
Payer: COMMERCIAL

## 2022-06-13 VITALS — WEIGHT: 210 LBS | HEIGHT: 73 IN | BODY MASS INDEX: 27.83 KG/M2

## 2022-06-13 DIAGNOSIS — L29.9 PRURITUS: ICD-10-CM

## 2022-06-13 DIAGNOSIS — L57.0 ACTINIC KERATOSIS: Primary | ICD-10-CM

## 2022-06-13 DIAGNOSIS — Z13.89 SCREENING FOR SKIN CONDITION: ICD-10-CM

## 2022-06-13 DIAGNOSIS — L71.9 ROSACEA: ICD-10-CM

## 2022-06-13 PROCEDURE — 17000 DESTRUCT PREMALG LESION: CPT | Performed by: DERMATOLOGY

## 2022-06-13 PROCEDURE — 99213 OFFICE O/P EST LOW 20 MIN: CPT | Performed by: DERMATOLOGY

## 2022-06-13 NOTE — PROGRESS NOTES
[address]  [facility] [address]  [phone]  [phone]     MRN: [id number] : 1945  Encounter: [id number]  Patient Information: Darin Henriquez  Chief complaint:  Itching    History of present illness:  72-year-old male presents for concerns regarding continued itching overall skin check no specific concerns at this time patient has been using intermittent over-the-counter hydrocortisone spray no other concerns noted  Past Medical History:   Diagnosis Date    Anxiety     Insomnia     Neuropathy      Past Surgical History:   Procedure Laterality Date    APPENDECTOMY      COLONOSCOPY      KNEE ARTHROSCOPY      Right knee    [place] COLONOSCOPY FLX DX W/COLLJ SPEC WHEN PFRMD N/A 2018    Procedure: COLONOSCOPY;  Surgeon: Linwood Matos MD;  Location: [facility]; Service: Gastroenterology     Social History   Social History     Substance and Sexual Activity   Alcohol Use Yes    Comment: Social     Social History     Substance and Sexual Activity   Drug Use No     Social History     Tobacco Use   Smoking Status Never Smoker   Smokeless Tobacco Never Used     Family History   Problem Relation Age of Onset    Stroke Mother     Lactose intolerance Sister      Meds/Allergies   No Known Allergies    Meds:  Prior to Admission medications    Medication Sig Start Date End Date Taking?  Authorizing Provider   clonazePAM (KlonoPIN) 1 mg tablet Take by mouth   Yes Historical Provider, MD   metroNIDAZOLE (METROGEL) 1 % gel Apply 1 application topically daily Sparingly to face and scalp 21  Yes Inga Ag MD   desoximetasone (TOPICORT) 0 25 % ointment Apply topically 2 (two) times a day To rash ionbody as needed  Patient not taking: Reported on 2022   Inga Ag MD   traMADol Jose [place]) 50 mg tablet Take 50 mg by mouth 4 (four) times a day as needed 17   Historical Provider, MD       Subjective:     Review of Systems:    General: negative for - chills, fatigue, fever,  weight gain or weight loss  Psychological: negative for - anxiety, behavioral disorder, concentration difficulties, decreased libido, depression, irritability, memory difficulties, mood swings, sleep disturbances or suicidal ideation  ENT: negative for - hearing difficulties , nasal congestion, nasal discharge, oral lesions, sinus pain, sneezing, sore throat  Allergy and Immunology: negative for - hives, insect bite sensitivity,  Hematological and Lymphatic: negative for - bleeding problems, blood clots,bruising, swollen lymph nodes  Endocrine: negative for - hair pattern changes, hot flashes, malaise/lethargy, mood swings, palpitations, polydipsia/polyuria, skin changes, temperature intolerance or unexpected weight change  Respiratory: negative for - cough, hemoptysis, orthopnea, shortness of breath, or wheezing  Cardiovascular: negative for - chest pain, dyspnea on exertion, edema,  Gastrointestinal: negative for - abdominal pain, nausea/vomiting  Genito-Urinary: negative for - dysuria, incontinence, irregular/heavy menses or urinary frequency/urgency  Musculoskeletal: negative for - gait disturbance, joint pain, joint stiffness, joint swelling, muscle pain, muscular weakness  Dermatological:  As in HPI  Neurological: negative for confusion, dizziness, headaches, impaired coordination/balance, memory loss, numbness/tingling, seizures, speech problems, tremors or weakness       Objective:   Ht 6' 1" (1 854 m)   Wt 95 3 kg (210 lb)   BMI 27 71 kg/m²     Physical Exam:    General Appearance:    Alert, cooperative, no distress   Head:    Normocephalic, without obvious abnormality, atraumatic           Skin:   A full skin exam was performed including scalp, head scalp, eyes, ears, nose, lips, neck, chest, axilla, abdomen, back, buttocks, bilateral upper extremities, bilateral lower extremities, hands, feet, fingers, toes, fingernails, and toenails scaling erythematous areas noted below normal keratotic papules with greasy stuck on appearance some scaling lichenification noted on back nothing else remarkable noted on complete exam normal pigmented lesion regular shape color  Physical Exam  HENT:      Head:          Scaling erythematous area noted below normal keratotic papules with greasy stuck on appearance no distinct rash noted at this time some skin excoriations noted upper back  Cryotherapy Procedure Note    Pre-operative Diagnosis: actinic keratosis    Plan:  1  Instructed to keep the area dry and clean thereafter  Apply petrolatum if area gets crusty  2  Recommended that the patient use acetaminophen  as needed for pain  Locations:  Right lateral canthus of the eye    Indications: Destruction of actinic keratosis x1    Patient informed of risks (permanent scarring, infection, light or dark discoloration, bleeding, infection, weakness, numbness and recurrence of the lesion) and benefits of the procedure and verbal informed consent obtained  The areas are treated with liquid nitrogen therapy, frozen until ice ball extended 2 mm beyond lesion, allowed to thaw, and treated again  The patient tolerated procedure well  The patient was instructed on post-op care, warned that there may be blister formation, redness and pain  Recommend OTC analgesia as needed for pain  Condition:  Stable    Complications:  none  Assessment:     1  Actinic keratosis     2  Pruritus     3  Rosacea     4  Screening for skin condition           Plan:   Actinic Keratosis:  Patient advised lesions are precancers  These should resolve with cryosurgery if not to let us know sun block recommended    Pruritus seems to be something that is been pretty chronic advised patient in that it is okay to use the hydrocortisone spray over-the-counter as much as you needs to see if this will help no improvement patient wishes use something stronger he let us know in addition we had mentioned the use of N-acetylcysteine for itching which he can obtain over-the-counter  Patient advised to take 600 mg daily  Rosacea under good control continue same therapy  Screening for Dermatologic Disorders: Nothing else of concern noted on complete exam follow up in 1 year         Sindhu Sanon MD  6/13/2022,3:41 PM    Portions of the record may have been created with voice recognition software   Occasional wrong word or "sound a like" substitutions may have occurred due to the inherent limitations of voice recognition software   Read the chart carefully and recognize, using context, where substitutions have occurred

## 2022-06-13 NOTE — PATIENT INSTRUCTIONS
Actinic Keratosis:  Patient advised lesions are precancers  These should resolve with cryosurgery if not to let us know sun block recommended    Pruritus seems to be something that is been pretty chronic advised patient in that it is okay to use the hydrocortisone spray over-the-counter as much as you needs to see if this will help no improvement patient wishes use something stronger he let us know in addition we had mentioned the use of N-acetylcysteine for itching which he can obtain over-the-counter  Patient advised to take 600 mg daily  Rosacea under good control continue same therapy  Screening for Dermatologic Disorders: Nothing else of concern noted on complete exam follow up in 1 year
98

## 2023-06-05 ENCOUNTER — TELEPHONE (OUTPATIENT)
Dept: OTHER | Facility: OTHER | Age: 78
End: 2023-06-05

## 2023-07-26 ENCOUNTER — TELEPHONE (OUTPATIENT)
Dept: GASTROENTEROLOGY | Facility: CLINIC | Age: 78
End: 2023-07-26

## 2025-07-09 ENCOUNTER — OFFICE VISIT (OUTPATIENT)
Age: 80
End: 2025-07-09
Payer: COMMERCIAL

## 2025-07-09 VITALS
WEIGHT: 258.6 LBS | HEART RATE: 88 BPM | HEIGHT: 73 IN | BODY MASS INDEX: 34.27 KG/M2 | SYSTOLIC BLOOD PRESSURE: 158 MMHG | DIASTOLIC BLOOD PRESSURE: 82 MMHG | TEMPERATURE: 97.7 F | RESPIRATION RATE: 14 BRPM | OXYGEN SATURATION: 98 %

## 2025-07-09 DIAGNOSIS — D18.01 CHERRY ANGIOMA: ICD-10-CM

## 2025-07-09 DIAGNOSIS — L81.4 LENTIGINES: Primary | ICD-10-CM

## 2025-07-09 DIAGNOSIS — D22.9 MULTIPLE MELANOCYTIC NEVI: ICD-10-CM

## 2025-07-09 DIAGNOSIS — D48.9 NEOPLASM OF UNCERTAIN BEHAVIOR: ICD-10-CM

## 2025-07-09 DIAGNOSIS — L82.1 SEBORRHEIC KERATOSIS: ICD-10-CM

## 2025-07-09 DIAGNOSIS — L57.0 KERATOSIS, ACTINIC: ICD-10-CM

## 2025-07-09 DIAGNOSIS — L30.8 PSORIASIFORM DERMATITIS: ICD-10-CM

## 2025-07-09 PROCEDURE — 99214 OFFICE O/P EST MOD 30 MIN: CPT

## 2025-07-09 PROCEDURE — 17003 DESTRUCT PREMALG LES 2-14: CPT

## 2025-07-09 PROCEDURE — 17000 DESTRUCT PREMALG LESION: CPT

## 2025-07-09 PROCEDURE — 88341 IMHCHEM/IMCYTCHM EA ADD ANTB: CPT | Performed by: STUDENT IN AN ORGANIZED HEALTH CARE EDUCATION/TRAINING PROGRAM

## 2025-07-09 PROCEDURE — 11102 TANGNTL BX SKIN SINGLE LES: CPT

## 2025-07-09 PROCEDURE — 88305 TISSUE EXAM BY PATHOLOGIST: CPT | Performed by: STUDENT IN AN ORGANIZED HEALTH CARE EDUCATION/TRAINING PROGRAM

## 2025-07-09 PROCEDURE — 88342 IMHCHEM/IMCYTCHM 1ST ANTB: CPT | Performed by: STUDENT IN AN ORGANIZED HEALTH CARE EDUCATION/TRAINING PROGRAM

## 2025-07-09 RX ORDER — AMLODIPINE BESYLATE 10 MG/1
10 TABLET ORAL DAILY
COMMUNITY
Start: 2025-06-09

## 2025-07-09 RX ORDER — ATORVASTATIN CALCIUM 40 MG/1
40 TABLET, FILM COATED ORAL DAILY
COMMUNITY
Start: 2025-05-31

## 2025-07-09 RX ORDER — CLOBETASOL PROPIONATE 0.5 MG/G
OINTMENT TOPICAL
Qty: 60 G | Refills: 2 | Status: SHIPPED | OUTPATIENT
Start: 2025-07-09

## 2025-07-09 RX ORDER — CLOPIDOGREL BISULFATE 75 MG/1
75 TABLET ORAL DAILY
COMMUNITY
Start: 2025-06-12 | End: 2025-12-09

## 2025-07-09 NOTE — PATIENT INSTRUCTIONS
"NEOPLASM OF UNCERTAIN BEHAVIOR OF SKIN    Assessment and Plan:  I have discussed with the patient that a sample of skin via a \"skin biopsy” would be potentially helpful to further make a specific diagnosis under the microscope.  Based on a thorough discussion of this condition and the management approach to it (including a comprehensive discussion of the known risks, side effects and potential benefits of treatment), the patient (family) agrees to implement the following specific plan:    Procedure:  Skin Biopsy.  After a thorough discussion of treatment options and risk/benefits/alternatives (including but not limited to local pain, scarring, dyspigmentation, blistering, possible superinfection, and inability to confirm a diagnosis via histopathology), verbal and written consent were obtained and portion of the rash was biopsied for tissue sample.  See below for consent that was obtained from patient and subsequent Procedure Note.  PROCEDURE TANGENTIAL (SHAVE) BIOPSY NOTE:    Performing Physician: Jackie Foster PA-C  Anatomic Location; Clinical Description with size (cm); Pre-Op Diagnosis:   Specimen A; 79 yr old male; skin; shave; left anterior shoulder; 0.7 cm x 0.6 cm crusted erythematous papule; DDX; Inflamed seborrheic keratosis vs squamous cell carcinoma    Post-op diagnosis: Same     Local anesthesia: 1% xylocaine with epi      Topical anesthesia: None    Hemostasis: Aluminum chloride       After obtaining informed consent  at which time there was a discussion about the purpose of biopsy  and low risks of infection and bleeding.  The area was prepped and draped in the usual fashion. Anesthesia was obtained with 1% lidocaine with epinephrine. A shave biopsy to an appropriate sampling depth was obtained by Shave (Dermablade or 15 blade) The resulting wound was covered with surgical ointment and bandaged appropriately.     The patient tolerated the procedure well without complications and was without signs of " "functional compromise.      Specimen has been sent for review by Dermatopathology.    Standard post-procedure care has been explained and has been included in written form within the patient's copy of Informed Consent.    INFORMED CONSENT DISCUSSION AND POST-OPERATIVE INSTRUCTIONS FOR PATIENT    I.  RATIONALE FOR PROCEDURE  I understand that a skin biopsy allows the Dermatologist to test a lesion or rash under the microscope to obtain a diagnosis.  It usually involves numbing the area with numbing medication and removing a small piece of skin; sometimes the area will be closed with sutures. In this specific procedure, sutures are not usually needed.  If any sutures are placed, then they are usually need to be removed in 2 weeks or less.    I understand that my Dermatologist recommends that a skin \"shave\" biopsy be performed today.  A local anesthetic, similar to the kind that a dentist uses when filling a cavity, will be injected with a very small needle into the skin area to be sampled.  The injected skin and tissue underneath \"will go to sleep” and become numb so no pain should be felt afterwards.  An instrument shaped like a tiny \"razor blade\" (shave biopsy instrument) will be used to cut a small piece of tissue and skin from the area so that a sample of tissue can be taken and examined more closely under the microscope.  A slight amount of bleeding will occur, but it will be stopped with direct pressure and a pressure bandage and any other appropriate methods.  I understands that a scar will form where the wound was created.  Surgical ointment will be applied to help protect the wound.  Sutures are not usually needed.    II.  RISKS AND POTENTIAL COMPLICATIONS   I understand the risks and potential complications of a skin biopsy include but are not limited to the following:  Bleeding  Infection  Pain  Scar/keloid  Skin discoloration  Incomplete Removal  Recurrence  Nerve Damage/Numbness/Loss of Function  Allergic " "Reaction to Anesthesia  Biopsies are diagnostic procedures and based on findings additional treatment or evaluation may be required  Loss or destruction of specimen resulting in no additional findings    My Dermatologist has explained to me the nature of the condition, the nature of the procedure, and the benefits to be reasonably expected compared with alternative approaches.  My Dermatologist has discussed the likelihood of major risks or complications of this procedure including the specific risks listed above, such as bleeding, infection, and scarring/keloid.  I understand that a scar is expected after this procedure.  I understand that my physician cannot predict if the scar will form a \"keloid,\" which extends beyond the borders of the wound that is created.  A keloid is a thick, painful, and bumpy scar.  A keloid can be difficult to treat, as it does not always respond well to therapy, which includes injecting cortisone directly into the keloid every few weeks.  While this usually reduces the pain and size of the scar, it does not eliminate it.      I understand that photographs may be taken before and after the procedure.  These will be maintained as part of the medical providers confidential records and may not be made available to me.  I further authorize the medical provider to use the photographs for teaching purposes or to illustrate scientific papers, books, or lectures if in his/her judgment, medical research, education, or science may benefit from its use.    I have had an opportunity to fully inquire about the risks and benefits of this procedure and its alternatives.   I have been given ample time and opportunity to ask questions and to seek a second opinion if I wished to do so.  I acknowledge that there have specifically been no guarantees as to the cosmetic results from the procedure.  I am aware that with any procedure there is always the possibility of an unexpected complication.    III. " "POST-PROCEDURAL CARE (WHAT YOU WILL NEED TO DO \"AFTER THE BIOPSY\" TO OPTIMIZE HEALING)    Keep the area clean and dry.  Try NOT to remove the bandage or get it wet for the first 24 hours.    Gently clean the area and apply surgical ointment (such as Vaseline petrolatum ointment, which is available \"over the counter\" and not a prescription) to the biopsy site for up to 2 weeks straight.  This acts to protect the wound from the outside world.      Sutures are not usually placed in this procedure.  If any sutures were placed, return for suture removal as instructed (generally 1 week for the face, 2 weeks for the body).      Take Acetaminophen (Tylenol) for discomfort, if no contraindications.  Ibuprofen or aspirin could make bleeding worse.    Call our office immediately for signs of infection: fever, chills, increased redness, warmth, tenderness, discomfort/pain, or pus or foul smell coming from the wound.    WHAT TO DO IF THERE IS ANY BLEEDING?  If a small amount of bleeding is noticed, place a clean cloth over the area and apply firm pressure for ten minutes.  Check the wound after 10 minutes of direct pressure.  If bleeding persists, try one more time for an additional 10 minutes of direct pressure on the area.  If the bleeding becomes heavier or does not stop after the second attempt, or if you have any other questions about this procedure, then please call your [facility] Dermatologist by calling [phone] (SKIN).     I hereby acknowledge that I have reviewed and verified the site with my Dermatologist and have requested and authorized my Dermatologist to proceed with the procedure.                  SEBORRHEIC KERATOSES  - Relevant exam: Scattered over the trunk/extremities are waxy brown to black plaques and papules with stuck on appearance  - Exam and clinical history consistent with seborrheic keratoses  - Counseled that these are benign growths that do not require treatment  - Counseled that removal of " lesions is considered cosmetic and so would incur a fee should patient elect to move forward.   - Patient to hold on treatments for now but will inform us should they desire additional treatments    MELANOCYTIC NEVI  -Relevant exam: Scattered over the trunk/extremities are homogenously pigmented brown macules and papules. ELM performed and without concerning findings.  - Exam and clinical history consistent with melanocytic nevi  - Educated on the ABCDE's of melanoma; handout provided  - Counseled to return to clinic prior to scheduled appointment should any of these lesions change or should any new lesions of concern arise  - Counseled on use of sun protection daily. Reviewed latest FDA sunscreen guidelines, including use of broad spectrum (UVA and UVB blocking) sunscreen or sun protective clothing with SPF 30-50 every 2-3 hours and reapplied after exposure to water; use of photoprotective clothing, including a broad brim hat and UPF rated clothing if outdoors for several hours; avoid use of tanning beds as these pose significant risk for melanoma and skin cancer.    LENTIGINES  OTHER SKIN CHANGES DUE TO CHRONIC EXPOSURE TO NONIONIZING RADIATION  - Relevant exam: Over sun exposed areas are brown macules. ELM performed and without concerning findings.  - Exam and clinical history consistent with lentigines.  - Educated that these are indicative of prior sun exposure.   - Counseled to return to clinic prior to scheduled appointment should any of these lesions change or should any new lesions of concern arise.  - Recommended use of sunscreen as above and below.  - Counseled on use of sun protection daily. Reviewed latest FDA sunscreen guidelines, including use of broad spectrum (UVA and UVB blocking) sunscreen or sun protective clothing with SPF 30-50 every 2-3 hours and reapplied after exposure to water; use of photoprotective clothing, including a broad brim hat and UPF rated clothing if outdoors for several hours;  avoid use of tanning beds as these pose significant risk for melanoma and skin cancer.    CHERRY ANGIOMAS  - Relevant exam: Scattered over the trunk/extremities are red papules  - Exam and clinical history consistent with cherry angiomas  - Educated that these are benign  - Educated that removal is considered aesthetic and would incur a fee.  - Patient does not wish to pursue removal at this time but will contact us should this change.    ACTINIC KERATOSES  - Relevant exam: On the arms, and right knee  are gritty pink papules  - Exam and clinical history consistent with actinic keratoses  - Discussed that these lesions are considered premalignant with the potential to evolve into squamous cell carcinoma.   - Discussed that these are due to chronic sun exposure and therefore recommend use of sunscreen/sun protection to prevent further sun damage  - Discussed treatment options, including liquid nitrogen destruction, topical immunotherapy, and photodynamic therapy, including risks, benefits    OPTION 1:     - The patient agreed to treatment with combination efudex/calcipotriene for **4 days BID to the face; 6 days BID to the extremities/trunk; 7 days BID to the scalp** - Common side effects for this treatment were discussed and handout provided  - Prescription ordered through [facility]. Informed patient that [facility] would contact patient to obtain billing information and shipping address. Patient provided the below preferred contact information for pharmacy:     Email:   Phone:      OPTION 2:     - The patient agreed to treatment with topical efudex 5% for **7 days BID to the face; 14 days BID to the extremities/trunk; 21 days BID to the scalp**   - Common side effects for this treatment were discussed and handout provided    OPTION 3:    PROCEDURE:  DESTRUCTION OF PRE-MALIGNANT LESIONS    - After a thorough discussion of treatment options and risk/benefits/alternatives (including but not limited to local  pain, scarring, dyspigmentation, blistering, and possible superinfection), verbal and written consent were obtained and the aforementioned lesions were treated on with cryotherapy using liquid nitrogen x 1 cycle for 5-10 seconds.    TOTAL NUMBER of 3 pre-malignant lesions were treated today on the ANATOMIC LOCATION: left arm, right arm, and right knee.     The patient tolerated the procedure well, and after-care instructions were provided.      Assessment and Plan:  Based on a thorough discussion of this condition and the management approach to it (including a comprehensive discussion of the known risks, side effects and potential benefits of treatment), the patient (family) agrees to implement the following specific plan:  Start clobetasol 0.05% ointment- Apply 2 times a day for up to 2 weeks to affected skin on neck down prn flares then take one week break and can repeat regimen prn flares. Do not apply to groin, skin folds, face.

## 2025-07-09 NOTE — PROGRESS NOTES
"[facility] Dermatology Clinic Note     Patient Name: Ronnie Olivo  Encounter Date: 7/9/2025       Have you been cared for by a [facility] Dermatologist in the last 3 years and, if so, which description applies to you? Yes. I have been here within the last 3 years, and my medical history has NOT changed since that time. I am not of child-bearing potential.     REVIEW OF SYSTEMS:  Have you recently had or currently have any of the following? No changes in my recent health.   PAST MEDICAL HISTORY:  Have you personally ever had or currently have any of the following?  If \"YES,\" then please provide more detail. No changes in my medical history.   HISTORY OF IMMUNOSUPPRESSION: Do you have a history of any of the following:  Systemic Immunosuppression such as Diabetes, Biologic or Immunotherapy, Chemotherapy, Organ Transplantation, Bone Marrow Transplantation or Prednisone?  No     Answering \"YES\" requires the addition of the dotphrase \"IMMUNOSUPPRESSED\" as the first diagnosis of the patient's visit.   FAMILY HISTORY:  Any \"first degree relatives\" (parent, brother, sister, or child) with the following?    No changes in my family's known health.   PATIENT EXPERIENCE:    Do you want the Dermatologist to perform a COMPLETE skin exam today including a clinical examination under the \"bra and underwear\" areas?  Yes  If necessary, do we have your permission to call and leave a detailed message on your Preferred Phone number that includes your specific medical information?  Yes      Allergies[1] Current Medications[2]              Whom besides the patient is providing clinical information about today's encounter?   NO ADDITIONAL HISTORIAN (patient alone provided history)    Physical Exam and Assessment/Plan by Diagnosis:      NEOPLASM OF UNCERTAIN BEHAVIOR OF SKIN    Physical Exam:  (Anatomic Location); (Size and Morphological Description); (Differential Diagnosis):  Specimen A; 79 yr old male; skin; shave; left anterior " "shoulder; 0.7 cm x 0.6 cm crusted erythematous papule; DDX; Inflamed seborrheic keratosis vs squamous cell carcinoma    Pertinent Positives:  Pertinent Negatives:    Additional History of Present Condition:  Present on exam.      Assessment and Plan:  I have discussed with the patient that a sample of skin via a \"skin biopsy” would be potentially helpful to further make a specific diagnosis under the microscope.  Based on a thorough discussion of this condition and the management approach to it (including a comprehensive discussion of the known risks, side effects and potential benefits of treatment), the patient (family) agrees to implement the following specific plan:    Procedure:  Skin Biopsy.  After a thorough discussion of treatment options and risk/benefits/alternatives (including but not limited to local pain, scarring, dyspigmentation, blistering, possible superinfection, and inability to confirm a diagnosis via histopathology), verbal and written consent were obtained and portion of the rash was biopsied for tissue sample.  See below for consent that was obtained from patient and subsequent Procedure Note.  PROCEDURE TANGENTIAL (SHAVE) BIOPSY NOTE:    Performing Physician: Jackie Foster PA-C  Anatomic Location; Clinical Description with size (cm); Pre-Op Diagnosis:   Specimen A; 79 yr old male; skin; shave; left anterior shoulder; 0.7 cm x 0.6 cm crusted erythematous papule; DDX; Inflamed seborrheic keratosis vs squamous cell carcinoma    Post-op diagnosis: Same     Local anesthesia: 1% xylocaine with epi      Topical anesthesia: None    Hemostasis: Aluminum chloride       After obtaining informed consent  at which time there was a discussion about the purpose of biopsy  and low risks of infection and bleeding.  The area was prepped and draped in the usual fashion. Anesthesia was obtained with 1% lidocaine with epinephrine. A shave biopsy to an appropriate sampling depth was obtained by Shave (Dermablade or " "15 blade) The resulting wound was covered with surgical ointment and bandaged appropriately.     The patient tolerated the procedure well without complications and was without signs of functional compromise.      Specimen has been sent for review by Dermatopathology.    Standard post-procedure care has been explained and has been included in written form within the patient's copy of Informed Consent.    INFORMED CONSENT DISCUSSION AND POST-OPERATIVE INSTRUCTIONS FOR PATIENT    I.  RATIONALE FOR PROCEDURE  I understand that a skin biopsy allows the Dermatologist to test a lesion or rash under the microscope to obtain a diagnosis.  It usually involves numbing the area with numbing medication and removing a small piece of skin; sometimes the area will be closed with sutures. In this specific procedure, sutures are not usually needed.  If any sutures are placed, then they are usually need to be removed in 2 weeks or less.    I understand that my Dermatologist recommends that a skin \"shave\" biopsy be performed today.  A local anesthetic, similar to the kind that a dentist uses when filling a cavity, will be injected with a very small needle into the skin area to be sampled.  The injected skin and tissue underneath \"will go to sleep” and become numb so no pain should be felt afterwards.  An instrument shaped like a tiny \"razor blade\" (shave biopsy instrument) will be used to cut a small piece of tissue and skin from the area so that a sample of tissue can be taken and examined more closely under the microscope.  A slight amount of bleeding will occur, but it will be stopped with direct pressure and a pressure bandage and any other appropriate methods.  I understands that a scar will form where the wound was created.  Surgical ointment will be applied to help protect the wound.  Sutures are not usually needed.    II.  RISKS AND POTENTIAL COMPLICATIONS   I understand the risks and potential complications of a skin biopsy " "include but are not limited to the following:  Bleeding  Infection  Pain  Scar/keloid  Skin discoloration  Incomplete Removal  Recurrence  Nerve Damage/Numbness/Loss of Function  Allergic Reaction to Anesthesia  Biopsies are diagnostic procedures and based on findings additional treatment or evaluation may be required  Loss or destruction of specimen resulting in no additional findings    My Dermatologist has explained to me the nature of the condition, the nature of the procedure, and the benefits to be reasonably expected compared with alternative approaches.  My Dermatologist has discussed the likelihood of major risks or complications of this procedure including the specific risks listed above, such as bleeding, infection, and scarring/keloid.  I understand that a scar is expected after this procedure.  I understand that my physician cannot predict if the scar will form a \"keloid,\" which extends beyond the borders of the wound that is created.  A keloid is a thick, painful, and bumpy scar.  A keloid can be difficult to treat, as it does not always respond well to therapy, which includes injecting cortisone directly into the keloid every few weeks.  While this usually reduces the pain and size of the scar, it does not eliminate it.      I understand that photographs may be taken before and after the procedure.  These will be maintained as part of the medical providers confidential records and may not be made available to me.  I further authorize the medical provider to use the photographs for teaching purposes or to illustrate scientific papers, books, or lectures if in his/her judgment, medical research, education, or science may benefit from its use.    I have had an opportunity to fully inquire about the risks and benefits of this procedure and its alternatives.   I have been given ample time and opportunity to ask questions and to seek a second opinion if I wished to do so.  I acknowledge that there have " "specifically been no guarantees as to the cosmetic results from the procedure.  I am aware that with any procedure there is always the possibility of an unexpected complication.    III. POST-PROCEDURAL CARE (WHAT YOU WILL NEED TO DO \"AFTER THE BIOPSY\" TO OPTIMIZE HEALING)    Keep the area clean and dry.  Try NOT to remove the bandage or get it wet for the first 24 hours.    Gently clean the area and apply surgical ointment (such as Vaseline petrolatum ointment, which is available \"over the counter\" and not a prescription) to the biopsy site for up to 2 weeks straight.  This acts to protect the wound from the outside world.      Sutures are not usually placed in this procedure.  If any sutures were placed, return for suture removal as instructed (generally 1 week for the face, 2 weeks for the body).      Take Acetaminophen (Tylenol) for discomfort, if no contraindications.  Ibuprofen or aspirin could make bleeding worse.    Call our office immediately for signs of infection: fever, chills, increased redness, warmth, tenderness, discomfort/pain, or pus or foul smell coming from the wound.    WHAT TO DO IF THERE IS ANY BLEEDING?  If a small amount of bleeding is noticed, place a clean cloth over the area and apply firm pressure for ten minutes.  Check the wound after 10 minutes of direct pressure.  If bleeding persists, try one more time for an additional 10 minutes of direct pressure on the area.  If the bleeding becomes heavier or does not stop after the second attempt, or if you have any other questions about this procedure, then please call your [facility]'s Dermatologist by calling [phone] (SKIN).     I hereby acknowledge that I have reviewed and verified the site with my Dermatologist and have requested and authorized my Dermatologist to proceed with the procedure.                  SEBORRHEIC KERATOSES  - Relevant exam: Scattered over the trunk/extremities are waxy brown to black plaques and papules with stuck " on appearance  - Exam and clinical history consistent with seborrheic keratoses  - Counseled that these are benign growths that do not require treatment  - Counseled that removal of lesions is considered cosmetic and so would incur a fee should patient elect to move forward.   - Patient to hold on treatments for now but will inform us should they desire additional treatments    MELANOCYTIC NEVI  -Relevant exam: Scattered over the trunk/extremities are homogenously pigmented brown macules and papules. ELM performed and without concerning findings.  - Exam and clinical history consistent with melanocytic nevi  - Educated on the ABCDE's of melanoma; handout provided  - Counseled to return to clinic prior to scheduled appointment should any of these lesions change or should any new lesions of concern arise  - Counseled on use of sun protection daily. Reviewed latest FDA sunscreen guidelines, including use of broad spectrum (UVA and UVB blocking) sunscreen or sun protective clothing with SPF 30-50 every 2-3 hours and reapplied after exposure to water; use of photoprotective clothing, including a broad brim hat and UPF rated clothing if outdoors for several hours; avoid use of tanning beds as these pose significant risk for melanoma and skin cancer.    LENTIGINES  OTHER SKIN CHANGES DUE TO CHRONIC EXPOSURE TO NONIONIZING RADIATION  - Relevant exam: Over sun exposed areas are brown macules. ELM performed and without concerning findings.  - Exam and clinical history consistent with lentigines.  - Educated that these are indicative of prior sun exposure.   - Counseled to return to clinic prior to scheduled appointment should any of these lesions change or should any new lesions of concern arise.  - Recommended use of sunscreen as above and below.  - Counseled on use of sun protection daily. Reviewed latest FDA sunscreen guidelines, including use of broad spectrum (UVA and UVB blocking) sunscreen or sun protective clothing  with SPF 30-50 every 2-3 hours and reapplied after exposure to water; use of photoprotective clothing, including a broad brim hat and UPF rated clothing if outdoors for several hours; avoid use of tanning beds as these pose significant risk for melanoma and skin cancer.    CHERRY ANGIOMAS  - Relevant exam: Scattered over the trunk/extremities are red papules  - Exam and clinical history consistent with cherry angiomas  - Educated that these are benign  - Educated that removal is considered aesthetic and would incur a fee.  - Patient does not wish to pursue removal at this time but will contact us should this change.    ACTINIC KERATOSES  - Relevant exam: On the arms, and right knee  are gritty pink papules  - Exam and clinical history consistent with actinic keratoses  - Discussed that these lesions are considered premalignant with the potential to evolve into squamous cell carcinoma.   - Discussed that these are due to chronic sun exposure and therefore recommend use of sunscreen/sun protection to prevent further sun damage  - Discussed treatment options, including liquid nitrogen destruction, topical immunotherapy, and photodynamic therapy, including risks, benefits    PROCEDURE:  DESTRUCTION OF PRE-MALIGNANT LESIONS    - After a thorough discussion of treatment options and risk/benefits/alternatives (including but not limited to local pain, scarring, dyspigmentation, blistering, and possible superinfection), verbal and written consent were obtained and the aforementioned lesions were treated on with cryotherapy using liquid nitrogen x 1 cycle for 5-10 seconds.    TOTAL NUMBER of 3 pre-malignant lesions were treated today on the ANATOMIC LOCATION: left arm, right arm, and right knee.     The patient tolerated the procedure well, and after-care instructions were provided.      PSORIASIFORM DERMATITIS  Physical Exam:  Anatomic Location Affected:  bilateral lower legs  Morphological Description: crusted erythematous  plaques  Pertinent Positives:  Pertinent Negatives:    Additional History of Present Condition:  Patient was last evaluated on 6/13/2022, by Dr. Sinclair.  He was previously using desoximetasone 0.25% ointment.  He has not been seen in a few years so he has not been using anything currently on skin.  He reports the areas are itchy.     Assessment and Plan:  Based on a thorough discussion of this condition and the management approach to it (including a comprehensive discussion of the known risks, side effects and potential benefits of treatment), the patient (family) agrees to implement the following specific plan:  Start clobetasol 0.05% ointment- Apply 2 times a day for up to 4 weeks to affected skin on lower legs then take one week break and can repeat regimen prn flares. Do not apply to groin, skin folds, face.  Apply daily moisturizer 3 times a day.         Scribe Attestation    I,:  Dori Lyles MA am acting as a scribe while in the presence of the attending physician.:       I,:  Jackie Foster PA-C personally performed the services described in this documentation    as scribed in my presence.:                  [1]  No Known Allergies[2]    Current Outpatient Medications:   •  amLODIPine (NORVASC) 10 mg tablet, Take 10 mg by mouth daily, Disp: , Rfl:   •  atorvastatin (LIPITOR) 40 mg tablet, Take 40 mg by mouth daily, Disp: , Rfl:   •  clobetasol (TEMOVATE) 0.05 % ointment, Apply 2 times a day for up to one month on plaques on legs. Take a week break and then restart as needed for flares., Disp: 60 g, Rfl: 2  •  clopidogrel (PLAVIX) 75 mg tablet, Take 75 mg by mouth daily, Disp: , Rfl:   •  metroNIDAZOLE (METROGEL) 1 % gel, Apply 1 application topically daily Sparingly to face and scalp, Disp: 60 g, Rfl: 3  •  clonazePAM (KlonoPIN) 1 mg tablet, Take by mouth, Disp: , Rfl:   •  traMADol (ULTRAM) 50 mg tablet, Take 50 mg by mouth 4 (four) times a day as needed, Disp: , Rfl: 0 constant

## 2025-07-15 PROCEDURE — 88341 IMHCHEM/IMCYTCHM EA ADD ANTB: CPT | Performed by: STUDENT IN AN ORGANIZED HEALTH CARE EDUCATION/TRAINING PROGRAM

## 2025-07-15 PROCEDURE — 88342 IMHCHEM/IMCYTCHM 1ST ANTB: CPT | Performed by: STUDENT IN AN ORGANIZED HEALTH CARE EDUCATION/TRAINING PROGRAM

## 2025-07-15 PROCEDURE — 88305 TISSUE EXAM BY PATHOLOGIST: CPT | Performed by: STUDENT IN AN ORGANIZED HEALTH CARE EDUCATION/TRAINING PROGRAM

## 2025-08-04 ENCOUNTER — OFFICE VISIT (OUTPATIENT)
Age: 80
End: 2025-08-04
Payer: COMMERCIAL

## 2025-08-04 PROCEDURE — 88305 TISSUE EXAM BY PATHOLOGIST: CPT | Performed by: PATHOLOGY
